# Patient Record
Sex: FEMALE | Race: WHITE | NOT HISPANIC OR LATINO | Employment: UNEMPLOYED | URBAN - METROPOLITAN AREA
[De-identification: names, ages, dates, MRNs, and addresses within clinical notes are randomized per-mention and may not be internally consistent; named-entity substitution may affect disease eponyms.]

---
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
## 2024-02-02 ENCOUNTER — HOSPITAL ENCOUNTER (EMERGENCY)
Facility: HOSPITAL | Age: 19
Discharge: HOME | End: 2024-02-02
Attending: EMERGENCY MEDICINE
Payer: COMMERCIAL

## 2024-02-02 VITALS
OXYGEN SATURATION: 100 % | DIASTOLIC BLOOD PRESSURE: 67 MMHG | RESPIRATION RATE: 16 BRPM | HEART RATE: 87 BPM | TEMPERATURE: 98.4 F | SYSTOLIC BLOOD PRESSURE: 115 MMHG

## 2024-02-02 DIAGNOSIS — R10.84 GENERALIZED ABDOMINAL PAIN: ICD-10-CM

## 2024-02-02 DIAGNOSIS — R11.2 NAUSEA & VOMITING: Primary | ICD-10-CM

## 2024-02-02 LAB
ALBUMIN SERPL BCP-MCNC: 4.7 G/DL (ref 3.5–5)
ALP SERPL-CCNC: 61 U/L (ref 34–104)
ALT SERPL W P-5'-P-CCNC: 21 U/L (ref 7–52)
ANION GAP SERPL CALCULATED.3IONS-SCNC: 7 MMOL/L
AST SERPL W P-5'-P-CCNC: 17 U/L (ref 13–39)
BASOPHILS # BLD AUTO: 0.01 THOUSANDS/ÂΜL (ref 0–0.1)
BASOPHILS NFR BLD AUTO: 0 % (ref 0–1)
BILIRUB SERPL-MCNC: 0.7 MG/DL (ref 0.2–1)
BUN SERPL-MCNC: 16 MG/DL (ref 5–25)
CALCIUM SERPL-MCNC: 8.9 MG/DL (ref 8.4–10.2)
CHLORIDE SERPL-SCNC: 102 MMOL/L (ref 96–108)
CO2 SERPL-SCNC: 26 MMOL/L (ref 21–32)
CREAT SERPL-MCNC: 0.68 MG/DL (ref 0.6–1.3)
EOSINOPHIL # BLD AUTO: 0.09 THOUSAND/ÂΜL (ref 0–0.61)
EOSINOPHIL NFR BLD AUTO: 1 % (ref 0–6)
ERYTHROCYTE [DISTWIDTH] IN BLOOD BY AUTOMATED COUNT: 13.2 % (ref 11.6–15.1)
EXT PREGNANCY TEST URINE: NEGATIVE
EXT. CONTROL: NORMAL
GFR SERPL CREATININE-BSD FRML MDRD: 127 ML/MIN/1.73SQ M
GLUCOSE SERPL-MCNC: 86 MG/DL (ref 65–140)
HCT VFR BLD AUTO: 40.7 % (ref 34.8–46.1)
HGB BLD-MCNC: 13.7 G/DL (ref 11.5–15.4)
IMM GRANULOCYTES # BLD AUTO: 0.03 THOUSAND/UL (ref 0–0.2)
IMM GRANULOCYTES NFR BLD AUTO: 0 % (ref 0–2)
LIPASE SERPL-CCNC: 125 U/L (ref 11–82)
LYMPHOCYTES # BLD AUTO: 1.33 THOUSANDS/ÂΜL (ref 0.6–4.47)
LYMPHOCYTES NFR BLD AUTO: 15 % (ref 14–44)
MCH RBC QN AUTO: 28.9 PG (ref 26.8–34.3)
MCHC RBC AUTO-ENTMCNC: 33.7 G/DL (ref 31.4–37.4)
MCV RBC AUTO: 86 FL (ref 82–98)
MONOCYTES # BLD AUTO: 0.57 THOUSAND/ÂΜL (ref 0.17–1.22)
MONOCYTES NFR BLD AUTO: 6 % (ref 4–12)
NEUTROPHILS # BLD AUTO: 7.14 THOUSANDS/ÂΜL (ref 1.85–7.62)
NEUTS SEG NFR BLD AUTO: 78 % (ref 43–75)
NRBC BLD AUTO-RTO: 0 /100 WBCS
PLATELET # BLD AUTO: 365 THOUSANDS/UL (ref 149–390)
PMV BLD AUTO: 9.5 FL (ref 8.9–12.7)
POTASSIUM SERPL-SCNC: 3.2 MMOL/L (ref 3.5–5.3)
PROT SERPL-MCNC: 7.7 G/DL (ref 6.4–8.4)
RBC # BLD AUTO: 4.74 MILLION/UL (ref 3.81–5.12)
SODIUM SERPL-SCNC: 135 MMOL/L (ref 135–147)
WBC # BLD AUTO: 9.17 THOUSAND/UL (ref 4.31–10.16)

## 2024-02-02 PROCEDURE — 85025 COMPLETE CBC W/AUTO DIFF WBC: CPT | Performed by: EMERGENCY MEDICINE

## 2024-02-02 PROCEDURE — 36415 COLL VENOUS BLD VENIPUNCTURE: CPT | Performed by: EMERGENCY MEDICINE

## 2024-02-02 PROCEDURE — 96361 HYDRATE IV INFUSION ADD-ON: CPT

## 2024-02-02 PROCEDURE — 83690 ASSAY OF LIPASE: CPT | Performed by: EMERGENCY MEDICINE

## 2024-02-02 PROCEDURE — 80053 COMPREHEN METABOLIC PANEL: CPT | Performed by: EMERGENCY MEDICINE

## 2024-02-02 PROCEDURE — 99284 EMERGENCY DEPT VISIT MOD MDM: CPT | Performed by: EMERGENCY MEDICINE

## 2024-02-02 PROCEDURE — 81025 URINE PREGNANCY TEST: CPT | Performed by: EMERGENCY MEDICINE

## 2024-02-02 PROCEDURE — 99284 EMERGENCY DEPT VISIT MOD MDM: CPT

## 2024-02-02 PROCEDURE — 96374 THER/PROPH/DIAG INJ IV PUSH: CPT

## 2024-02-02 RX ORDER — ONDANSETRON 4 MG/1
4 TABLET, FILM COATED ORAL EVERY 6 HOURS
Qty: 12 TABLET | Refills: 0 | Status: SHIPPED | OUTPATIENT
Start: 2024-02-02

## 2024-02-02 RX ORDER — POTASSIUM CHLORIDE 20 MEQ/1
20 TABLET, EXTENDED RELEASE ORAL ONCE
Status: COMPLETED | OUTPATIENT
Start: 2024-02-02 | End: 2024-02-02

## 2024-02-02 RX ORDER — DICYCLOMINE HCL 20 MG
20 TABLET ORAL 2 TIMES DAILY
Qty: 20 TABLET | Refills: 0 | Status: SHIPPED | OUTPATIENT
Start: 2024-02-02

## 2024-02-02 RX ORDER — DICYCLOMINE HYDROCHLORIDE 10 MG/1
20 CAPSULE ORAL ONCE
Status: COMPLETED | OUTPATIENT
Start: 2024-02-02 | End: 2024-02-02

## 2024-02-02 RX ORDER — ACETAMINOPHEN 10 MG/ML
1000 INJECTION, SOLUTION INTRAVENOUS ONCE
Status: DISCONTINUED | OUTPATIENT
Start: 2024-02-02 | End: 2024-02-02 | Stop reason: HOSPADM

## 2024-02-02 RX ORDER — ONDANSETRON 2 MG/ML
4 INJECTION INTRAMUSCULAR; INTRAVENOUS ONCE
Status: COMPLETED | OUTPATIENT
Start: 2024-02-02 | End: 2024-02-02

## 2024-02-02 RX ADMIN — ONDANSETRON 4 MG: 2 INJECTION INTRAMUSCULAR; INTRAVENOUS at 02:34

## 2024-02-02 RX ADMIN — SODIUM CHLORIDE 1000 ML: 0.9 INJECTION, SOLUTION INTRAVENOUS at 02:31

## 2024-02-02 RX ADMIN — POTASSIUM CHLORIDE 20 MEQ: 1500 TABLET, EXTENDED RELEASE ORAL at 03:06

## 2024-02-02 RX ADMIN — DICYCLOMINE HYDROCHLORIDE 20 MG: 10 CAPSULE ORAL at 02:33

## 2024-02-02 NOTE — DISCHARGE INSTRUCTIONS
Your lab work today was normal.  Use the prescribed Zofran for nausea, Bentyl for crampy abdominal pain.  Drink lots of fluids.  If you have any severe worsening of nausea and vomiting and are not able to drink fluids, severe worsening of your abdominal pain, significant blood in your vomit or blood in your stool, return to the emergency department.

## 2024-02-02 NOTE — ED PROVIDER NOTES
History  Chief Complaint   Patient presents with    Abdominal Pain     Pt reports generalized stomach pains x few days, also reports nausea and vomiting. Reports body aches and a general feeling unwell.      HPI  Patient is a 18-year-old female presenting for evaluation of 2 days of generalized but primarily upper abdominal pain, fevers, chills, myalgias, nausea, vomiting.  Patient states slight cough but also states that she smokes a vape and feels that this cough is essentially her baseline.  Patient states several episodes of nonbloody nonbilious emesis today and numerous episodes of dry heaving.  Patient states that she has not been able to keep any fluids down but states that she is only urinated 2 times today.  Patient denies diarrhea, constipation.  Patient Nuys dysuria, hematuria, flank pain.  Prior to Admission Medications   Prescriptions Last Dose Informant Patient Reported? Taking?   midodrine (PROAMATINE) 5 mg tablet Not Taking  No No   Sig: Take 1 tablet (5 mg total) by mouth 3 (three) times a day before meals   Patient not taking: Reported on 2/2/2024      Facility-Administered Medications: None       Past Medical History:   Diagnosis Date    Ovarian cyst     Psychiatric disorder     Urinary tract infection        Past Surgical History:   Procedure Laterality Date    OVARIAN CYST SURGERY         History reviewed. No pertinent family history.  I have reviewed and agree with the history as documented.    E-Cigarette/Vaping    E-Cigarette Use Current Every Day User      E-Cigarette/Vaping Substances    Nicotine Yes     THC No     CBD No     Flavoring Yes     Other No     Unknown No      Social History     Tobacco Use    Smoking status: Never     Passive exposure: Yes    Smokeless tobacco: Never   Vaping Use    Vaping status: Every Day    Substances: Nicotine, Flavoring   Substance Use Topics    Alcohol use: Never    Drug use: Never       Review of Systems   Constitutional:  Positive for chills and fever.  Negative for fatigue.   Respiratory:  Positive for cough. Negative for shortness of breath.    Gastrointestinal:  Positive for abdominal pain, nausea and vomiting. Negative for diarrhea.   Genitourinary:  Positive for decreased urine volume. Negative for frequency.   Musculoskeletal:  Positive for myalgias. Negative for arthralgias.   Neurological:  Negative for headaches.   Psychiatric/Behavioral:  Negative for confusion.    All other systems reviewed and are negative.      Physical Exam  Physical Exam  Vitals and nursing note reviewed.   Constitutional:       General: She is not in acute distress.     Appearance: Normal appearance. She is not ill-appearing, toxic-appearing or diaphoretic.   HENT:      Head: Normocephalic and atraumatic.      Comments: Moist mucous membranes.  Not actively vomiting.     Right Ear: External ear normal.      Left Ear: External ear normal.   Eyes:      General:         Right eye: No discharge.         Left eye: No discharge.   Cardiovascular:      Comments: Sinus tachycardia rate of 104.  No murmurs rubs or gallops.  Extremities warm and well-perfused without mottling.  Pulmonary:      Effort: No respiratory distress.      Comments: No increased work of breathing.  Speaking in complete sentences.  Lungs clear to auscultation bilaterally without wheezes, rales, rhonchi.  Satting 100% on room air indicating adequate oxygenation.  Abdominal:      General: There is no distension.      Tenderness: There is abdominal tenderness.      Comments: Primarily upper abdominal tenderness most pronounced in the left upper quadrant.  Abdomen nondistended with normal bowel sounds.  No rigidity, rebound, guarding   Musculoskeletal:         General: No deformity.      Cervical back: Normal range of motion.   Skin:     Findings: No lesion or rash.   Neurological:      Mental Status: She is alert and oriented to person, place, and time. Mental status is at baseline.      Comments: Awake, alert, pleasant,  interactive   Psychiatric:         Mood and Affect: Mood and affect normal.         Vital Signs  ED Triage Vitals [02/02/24 0203]   Temperature Pulse Respirations Blood Pressure SpO2   98.4 °F (36.9 °C) 104 16 121/71 100 %      Temp Source Heart Rate Source Patient Position - Orthostatic VS BP Location FiO2 (%)   Oral Monitor Sitting Right arm --      Pain Score       5           Vitals:    02/02/24 0203 02/02/24 0332   BP: 121/71 115/67   Pulse: 104 87   Patient Position - Orthostatic VS: Sitting Lying         Visual Acuity      ED Medications  Medications   acetaminophen (Ofirmev) injection 1,000 mg (1,000 mg Intravenous Not Given 2/2/24 0234)   sodium chloride 0.9 % bolus 1,000 mL (0 mL Intravenous Stopped 2/2/24 0306)   dicyclomine (BENTYL) capsule 20 mg (20 mg Oral Given 2/2/24 0233)   ondansetron (ZOFRAN) injection 4 mg (4 mg Intravenous Given 2/2/24 0234)   potassium chloride (Klor-Con M20) CR tablet 20 mEq (20 mEq Oral Given 2/2/24 0306)       Diagnostic Studies  Results Reviewed       Procedure Component Value Units Date/Time    Comprehensive metabolic panel [[id number]]  (Abnormal) Collected: 02/02/24 0230    Lab Status: Final result Specimen: Blood from Arm, Left Updated: 02/02/24 0258     Sodium 135 mmol/L      Potassium 3.2 mmol/L      Chloride 102 mmol/L      CO2 26 mmol/L      ANION GAP 7 mmol/L      BUN 16 mg/dL      Creatinine 0.68 mg/dL      Glucose 86 mg/dL      Calcium 8.9 mg/dL      AST 17 U/L      ALT 21 U/L      Alkaline Phosphatase 61 U/L      Total Protein 7.7 g/dL      Albumin 4.7 g/dL      Total Bilirubin 0.70 mg/dL      eGFR 127 ml/min/1.73sq m     Narrative:      National Kidney Disease Foundation guidelines for Chronic Kidney Disease (CKD):     Stage 1 with normal or high GFR (GFR > 90 mL/min/1.73 square meters)    Stage 2 Mild CKD (GFR = 60-89 mL/min/1.73 square meters)    Stage 3A Moderate CKD (GFR = 45-59 mL/min/1.73 square meters)    Stage 3B Moderate CKD (GFR = 30-44 mL/min/1.73  "square meters)    Stage 4 Severe CKD (GFR = 15-29 mL/min/1.73 square meters)    Stage 5 End Stage CKD (GFR <15 mL/min/1.73 square meters)  Note: GFR calculation is accurate only with a steady state creatinine    Lipase [[id number]]  (Abnormal) Collected: 02/02/24 0230    Lab Status: Final result Specimen: Blood from Arm, Left Updated: 02/02/24 0258     Lipase 125 u/L     CBC and differential [[id number]]  (Abnormal) Collected: 02/02/24 0230    Lab Status: Final result Specimen: Blood from Arm, Left Updated: 02/02/24 0241     WBC 9.17 Thousand/uL      RBC 4.74 Million/uL      Hemoglobin 13.7 g/dL      Hematocrit 40.7 %      MCV 86 fL      MCH 28.9 pg      MCHC 33.7 g/dL      RDW 13.2 %      MPV 9.5 fL      Platelets 365 Thousands/uL      nRBC 0 /100 WBCs      Neutrophils Relative 78 %      Immat GRANS % 0 %      Lymphocytes Relative 15 %      Monocytes Relative 6 %      Eosinophils Relative 1 %      Basophils Relative 0 %      Neutrophils Absolute 7.14 Thousands/µL      Immature Grans Absolute 0.03 Thousand/uL      Lymphocytes Absolute 1.33 Thousands/µL      Monocytes Absolute 0.57 Thousand/µL      Eosinophils Absolute 0.09 Thousand/µL      Basophils Absolute 0.01 Thousands/µL     POCT pregnancy, urine [[id number]]  (Normal) Resulted: 02/02/24 0233    Lab Status: Final result Updated: 02/02/24 0233     EXT Preg Test, Ur Negative     Control Valid                   No orders to display              Procedures  Procedures         ED Course         CRAFFT      Flowsheet Row Most Recent Value   CRAFFT Initial Screen: During the past 12 months, did you:    1. Drink any alcohol (more than a few sips)?  No Filed at: 02/02/2024 0202   2. Smoke any marijuana or hashish No Filed at: 02/02/2024 0202   3. Use anything else to get high? (\"anything else\" includes illegal drugs, over the counter and prescription drugs, and things that you sniff or 'castellano')? No Filed at: 02/02/2024 0202                                            Medical " Decision Making  I obtained history from the patient.  I reviewed external medical documentation which was noncontributory.  Patient's nausea, vomiting, abdominal pain, myalgias, fevers, chills all consistent with a viral syndrome.  Patient with no external signs of dehydration but is mildly tachycardic in setting decreased urination.  I ordered and reviewed lab work including CBC, CMP, lipase, urine pregnancy test.  I treated patient with IV fluids, Zofran, Bentyl.  Patient with some continued pain but improved nausea and able to drink water and eat crackers.  Patient without significant lab derangements.  Provided with reassurance, prescription for Zofran, Bentyl, discharged with return precautions.    Amount and/or Complexity of Data Reviewed  Labs: ordered.    Risk  Prescription drug management.             Disposition  Final diagnoses:   Nausea & vomiting   Generalized abdominal pain     Time reflects when diagnosis was documented in both MDM as applicable and the Disposition within this note       Time User Action Codes Description Comment    2/2/2024  3:28 AM Jhonatan Perez Add [R11.2] Nausea & vomiting     2/2/2024  3:28 AM Jhonatan Perez Add [R10.84] Generalized abdominal pain           ED Disposition       ED Disposition   Discharge    Condition   Stable    Date/Time   Fri Feb 2, 2024 0327    Comment   Leo Resendiz discharge to home/self care.                   Follow-up Information       Follow up With Specialties Details Why Contact Info Additional Information    [facility] Emergency Medicine  If symptoms worsen [address] [facility] [address]  [phone] [facility] Emergency Department, [address], [place], [address]            Discharge Medication List as of 2/2/2024  3:29 AM        START taking these medications    Details   dicyclomine (BENTYL) 20 mg tablet Take 1 tablet (20 mg total) by mouth 2  (two) times a day, Starting Fri 2/2/2024, Normal      ondansetron (ZOFRAN) 4 mg tablet Take 1 tablet (4 mg total) by mouth every 6 (six) hours, Starting Fri 2/2/2024, Normal           CONTINUE these medications which have NOT CHANGED    Details   midodrine (PROAMATINE) 5 mg tablet Take 1 tablet (5 mg total) by mouth 3 (three) times a day before meals, Starting Thu 3/16/2023, Normal             No discharge procedures on file.    PDMP Review       None            ED Provider  Electronically Signed by             Jhonatan Perez MD  02/02/24 8228

## 2024-02-02 NOTE — ED NOTES
Patient being PO challenged as per Dr. Perez's orders. Saltines and apple juice provided.     Marilynn Velasquez RN  02/02/24 2175

## 2025-04-29 ENCOUNTER — OFFICE VISIT (OUTPATIENT)
Dept: URGENT CARE | Facility: CLINIC | Age: 20
End: 2025-04-29
Payer: COMMERCIAL

## 2025-04-29 VITALS
SYSTOLIC BLOOD PRESSURE: 138 MMHG | HEART RATE: 70 BPM | WEIGHT: 142 LBS | BODY MASS INDEX: 26.83 KG/M2 | DIASTOLIC BLOOD PRESSURE: 84 MMHG | RESPIRATION RATE: 18 BRPM | TEMPERATURE: 98.1 F | OXYGEN SATURATION: 98 %

## 2025-04-29 DIAGNOSIS — R11.0 NAUSEA: Primary | ICD-10-CM

## 2025-04-29 LAB
SL AMB  POCT GLUCOSE, UA: ABNORMAL
SL AMB LEUKOCYTE ESTERASE,UA: ABNORMAL
SL AMB POCT BILIRUBIN,UA: ABNORMAL
SL AMB POCT BLOOD,UA: ABNORMAL
SL AMB POCT CLARITY,UA: CLEAR
SL AMB POCT COLOR,UA: YELLOW
SL AMB POCT KETONES,UA: 40
SL AMB POCT NITRITE,UA: ABNORMAL
SL AMB POCT PH,UA: 6
SL AMB POCT SPECIFIC GRAVITY,UA: 1.02
SL AMB POCT URINE HCG: NORMAL
SL AMB POCT URINE PROTEIN: 100
SL AMB POCT UROBILINOGEN: 0.2

## 2025-04-29 PROCEDURE — 99213 OFFICE O/P EST LOW 20 MIN: CPT | Performed by: PHYSICIAN ASSISTANT

## 2025-04-29 PROCEDURE — 81002 URINALYSIS NONAUTO W/O SCOPE: CPT | Performed by: PHYSICIAN ASSISTANT

## 2025-04-29 PROCEDURE — 81025 URINE PREGNANCY TEST: CPT | Performed by: PHYSICIAN ASSISTANT

## 2025-04-29 RX ORDER — FAMOTIDINE 20 MG/1
20 TABLET, FILM COATED ORAL
Qty: 30 TABLET | Refills: 0 | Status: SHIPPED | OUTPATIENT
Start: 2025-04-29 | End: 2025-05-29

## 2025-04-29 RX ORDER — ONDANSETRON 4 MG/1
4 TABLET, FILM COATED ORAL EVERY 8 HOURS PRN
Qty: 20 TABLET | Refills: 0 | Status: SHIPPED | OUTPATIENT
Start: 2025-04-29

## 2025-04-29 NOTE — PROGRESS NOTES
[facility]        NAME: Leo Resendiz is a 19 y.o. female  : 2005    MRN: [id number]  DATE: 2025  TIME: 7:10 PM    Assessment and Plan   Nausea [R11.0]  1. Nausea  POCT urine dip    POCT urine HCG    ondansetron (ZOFRAN) 4 mg tablet    famotidine (PEPCID) 20 mg tablet    Ambulatory Referral to Gastroenterology        Discussed with patient possibility of acid reflux due to late-night eating versus cannabinoid hyperemesis syndrome.  Referral to GI placed.    Patient Instructions     Patient Instructions   Follow up with GI. Take the meds as discussed.       Follow up with PCP in 3-5 days.  Proceed to  ER if symptoms worsen.    Chief Complaint     Chief Complaint   Patient presents with    Vomiting     Nausea and vomiting in the morning around 4-5 times for three weeks, loss of appetite for two weeks.         History of Present Illness       The patient is a 19-year old female presenting to [facility] with 2-3 weeks of nausea and vomiting. She states that every morning she has 3-4 episodes of nonbloody, nonbilious vomiting. She reports continued nausea throughout the day. She reports that her last meal of the day is at 1-2 a.m. because she works until 11 PM.  She denies abdominal pain, diarrhea, constipation, fever or chills. She denies sick contacts. No changes to her diet or known food allergies. She denies that she could be pregnant.  Does admit to smoking marijuana daily and throughout the day.    Vomiting   Pertinent negatives include no abdominal pain, chest pain, chills, coughing, diarrhea, dizziness or fever.       Review of Systems   Review of Systems   Constitutional:  Positive for appetite change (decreased appetite). Negative for chills and fever.   Respiratory:  Negative for cough and shortness of breath.    Cardiovascular:  Negative for chest pain and palpitations.   Gastrointestinal:  Positive for nausea and vomiting. Negative for abdominal distention, abdominal pain, blood in  stool, constipation and diarrhea.   Genitourinary: Negative.    Skin:  Negative for pallor.   Allergic/Immunologic: Negative for food allergies.   Neurological:  Negative for dizziness and light-headedness.         Current Medications       Current Outpatient Medications:     famotidine (PEPCID) 20 mg tablet, Take 1 tablet (20 mg total) by mouth daily at bedtime, Disp: 30 tablet, Rfl: 0    ondansetron (ZOFRAN) 4 mg tablet, Take 1 tablet (4 mg total) by mouth every 8 (eight) hours as needed for nausea or vomiting, Disp: 20 tablet, Rfl: 0    dicyclomine (BENTYL) 20 mg tablet, Take 1 tablet (20 mg total) by mouth 2 (two) times a day (Patient not taking: Reported on 4/29/2025), Disp: 20 tablet, Rfl: 0    midodrine (PROAMATINE) 5 mg tablet, Take 1 tablet (5 mg total) by mouth 3 (three) times a day before meals (Patient not taking: Reported on 4/29/2025), Disp: 90 tablet, Rfl: 5    ondansetron (ZOFRAN) 4 mg tablet, Take 1 tablet (4 mg total) by mouth every 6 (six) hours (Patient not taking: Reported on 4/29/2025), Disp: 12 tablet, Rfl: 0    Current Allergies     Allergies as of 04/29/2025 - Reviewed 04/29/2025   Allergen Reaction Noted    Cheese - food allergy Hives and Lip Swelling 01/10/2019            The following portions of the patient's history were reviewed and updated as appropriate: allergies, current medications, past family history, past medical history, past social history, past surgical history and problem list.     Past Medical History:   Diagnosis Date    Ovarian cyst     Psychiatric disorder     Urinary tract infection        Past Surgical History:   Procedure Laterality Date    OVARIAN CYST SURGERY         History reviewed. No pertinent family history.      Medications have been verified.        Objective   /84   Pulse 70   Temp 98.1 °F (36.7 °C)   Resp 18   Wt 64.4 kg (142 lb)   SpO2 98%   BMI 26.83 kg/m²        Physical Exam     Physical Exam  Constitutional:       General: She is not in  acute distress.     Appearance: Normal appearance. She is normal weight. She is not ill-appearing, toxic-appearing or diaphoretic.   Cardiovascular:      Rate and Rhythm: Normal rate and regular rhythm.      Heart sounds:      No friction rub.   Pulmonary:      Effort: Pulmonary effort is normal.      Breath sounds: Normal breath sounds. No stridor. No wheezing, rhonchi or rales.   Abdominal:      General: Abdomen is flat. There is no distension.      Palpations: Abdomen is soft. There is no mass.      Tenderness: There is abdominal tenderness. There is no guarding or rebound.      Comments: Tender to palpation in epigastric region   Skin:     General: Skin is warm and dry.   Neurological:      Mental Status: She is alert.

## 2025-04-29 NOTE — LETTER
April 29, 2025     Patient: Leo Resendiz  YOB: 2005  Date of Visit: 4/29/2025      To Whom it May Concern:    Leo Resendiz is under my professional care. Leo was seen in my office on 4/29/2025. Leo may return to work on 4/30/25 .    If you have any questions or concerns, please don't hesitate to call.         Sincerely,          Valeria Triplett PA-C        CC: No Recipients